# Patient Record
Sex: FEMALE
[De-identification: names, ages, dates, MRNs, and addresses within clinical notes are randomized per-mention and may not be internally consistent; named-entity substitution may affect disease eponyms.]

---

## 2017-07-24 PROBLEM — Z00.00 ENCOUNTER FOR PREVENTIVE HEALTH EXAMINATION: Status: ACTIVE | Noted: 2017-07-24

## 2017-07-25 ENCOUNTER — APPOINTMENT (OUTPATIENT)
Dept: OPHTHALMOLOGY | Facility: CLINIC | Age: 67
End: 2017-07-25

## 2023-07-31 ENCOUNTER — APPOINTMENT (OUTPATIENT)
Dept: PODIATRY | Facility: CLINIC | Age: 73
End: 2023-07-31
Payer: MEDICARE

## 2023-07-31 DIAGNOSIS — M76.72 PERONEAL TENDINITIS, LEFT LEG: ICD-10-CM

## 2023-07-31 DIAGNOSIS — M21.619 BUNION OF UNSPECIFIED FOOT: ICD-10-CM

## 2023-07-31 DIAGNOSIS — M21.40 FLAT FOOT [PES PLANUS] (ACQUIRED), UNSPECIFIED FOOT: ICD-10-CM

## 2023-07-31 DIAGNOSIS — Z78.9 OTHER SPECIFIED HEALTH STATUS: ICD-10-CM

## 2023-07-31 DIAGNOSIS — S93.402A SPRAIN OF UNSPECIFIED LIGAMENT OF LEFT ANKLE, INITIAL ENCOUNTER: ICD-10-CM

## 2023-07-31 DIAGNOSIS — Z86.39 PERSONAL HISTORY OF OTHER ENDOCRINE, NUTRITIONAL AND METABOLIC DISEASE: ICD-10-CM

## 2023-07-31 PROCEDURE — 73600 X-RAY EXAM OF ANKLE: CPT | Mod: LT

## 2023-07-31 PROCEDURE — 99213 OFFICE O/P EST LOW 20 MIN: CPT | Mod: 25

## 2023-07-31 PROCEDURE — 73630 X-RAY EXAM OF FOOT: CPT | Mod: LT

## 2023-08-02 PROBLEM — Z86.39 HISTORY OF HYPERCHOLESTEROLEMIA: Status: RESOLVED | Noted: 2023-07-31 | Resolved: 2023-08-02

## 2023-08-07 PROBLEM — S93.402A SPRAIN OF ANKLE, LEFT: Status: ACTIVE | Noted: 2023-08-02

## 2023-08-07 PROBLEM — M76.72 PERONEAL TENDINITIS OF LEFT LOWER EXTREMITY: Status: ACTIVE | Noted: 2023-08-02

## 2023-08-21 NOTE — ASSESSMENT
[FreeTextEntry1] : Impression: Peroneal tendonitis. Pes planus bilaterally. Ankle sprain left.  Treatment: Discussed findings and condition with the patient along with the patient's daughter who also translated for her mother. Patient is to avoid elliptical at this time at the gym. She is to walk and do non-aerobic activity. An ace wrap was applied and she was instructed on use to apply in the morning and remove at night. She is to ice the area. She is to continue with Meloxicam with Celebrex, one or the other. She is not to take more than prescribed and only one medication per day. She was recommended OTC Powerstep insoles for her sneakers and for her shoes. She is to avoid walking barefoot. She is to avoid flats and recommend a slight wedge and more of a rigid sole shoe. The patient is to return in approximately 2 to 3 weeks if pain continues. Any increase in pain, swelling or redness the patient is to return sooner and contact the office.

## 2023-08-21 NOTE — HISTORY OF PRESENT ILLNESS
[Sneakers] : tereza [FreeTextEntry1] : Patient presents today with her daughter. She is a [place]-speaking female. Daughter is available for translation and was available for the entire exam. The patient complains of pain on the bottom of the ball of her left foot and new onset of pain on the lateral aspect of her left foot and ankle. She denies any history or trauma. She states the issue on the lateral aspect of the foot and ankle has been present for approximately one month. She denies any history of trauma or twisting of the foot but she states that she started going to the gym approximately 1 month ago. She denies taking any medications for the pain. She is on Celebrex and Meloxicam as needed for multiple joint discomfort and arthritis. She denies any other pedal complaints at this time. She presents wearing a slip-on type sneaker. She denies any pain with first steps in the morning and it becomes painful with ambulation and throughout the day. She denies any redness or warmth to the joint. She does state it begins to swell later on in the day with increase in activity.

## 2023-08-21 NOTE — PHYSICAL EXAM
[2+] : left foot dorsalis pedis 2+ [FreeTextEntry3] : Temperature gradient within normal limits. CFT: 3 seconds x10. Negative hair growth.  [de-identified] : She has a forefoot varus fully compensated with pain on palpation of the plantar submet. 2 and 3 of the left foot. 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 The patient has pain on palpation of the base of the 5th metatarsal tuberosity at the insertion of the peroneal tendon. There is negative pain on palpation proximally to the peroneal tendon. Negative pain on palpation of the cuboid. There is increased mobility of the lateral column. Negative pain plantarly along the peroneal longus or plantar cuboid area. Negative pain on palpation of the plantar fascia band. Ankle joint and subtalar joint ROM was within normal limits. There is some tenderness along the collateral ligaments of the left ankle.   [FreeTextEntry1] : Epicritic sensation and light touch are intact bilateral.

## 2023-08-21 NOTE — PROCEDURE
[FreeTextEntry1] : X-rays were taken of the ankle and foot to rule out avulsion fracture and stress fracture of the left foot. Findings weight-bearing, DP, medial oblique of the left ankle and DP, medial oblique and lateral of the left foot were obtained. There is negative fracture, dislocation, subluxation or signs of foreign body. Pes planus is noted.

## 2023-10-03 ENCOUNTER — APPOINTMENT (OUTPATIENT)
Dept: PODIATRY | Facility: CLINIC | Age: 73
End: 2023-10-03

## 2023-10-04 ENCOUNTER — APPOINTMENT (OUTPATIENT)
Dept: PODIATRY | Facility: CLINIC | Age: 73
End: 2023-10-04
Payer: MEDICARE

## 2023-10-04 DIAGNOSIS — M77.42 METATARSALGIA, RIGHT FOOT: ICD-10-CM

## 2023-10-04 DIAGNOSIS — M72.2 PLANTAR FASCIAL FIBROMATOSIS: ICD-10-CM

## 2023-10-04 DIAGNOSIS — M77.41 METATARSALGIA, RIGHT FOOT: ICD-10-CM

## 2023-10-04 PROCEDURE — 99213 OFFICE O/P EST LOW 20 MIN: CPT | Mod: 25

## 2023-10-04 PROCEDURE — 20550 NJX 1 TENDON SHEATH/LIGAMENT: CPT | Mod: RT

## 2023-10-04 PROCEDURE — 73630 X-RAY EXAM OF FOOT: CPT | Mod: RT

## 2023-11-08 ENCOUNTER — APPOINTMENT (OUTPATIENT)
Dept: PODIATRY | Facility: CLINIC | Age: 73
End: 2023-11-08
Payer: MEDICARE

## 2023-11-08 PROCEDURE — 99213 OFFICE O/P EST LOW 20 MIN: CPT

## 2023-11-08 RX ORDER — MELOXICAM 15 MG/1
15 TABLET ORAL DAILY
Qty: 14 | Refills: 1 | Status: ACTIVE | COMMUNITY
Start: 2023-11-08 | End: 1900-01-01

## 2023-11-22 ENCOUNTER — APPOINTMENT (OUTPATIENT)
Dept: PODIATRY | Facility: CLINIC | Age: 73
End: 2023-11-22

## 2023-11-22 DIAGNOSIS — M76.821 POSTERIOR TIBIAL TENDINITIS, RIGHT LEG: ICD-10-CM

## 2023-12-06 ENCOUNTER — APPOINTMENT (OUTPATIENT)
Dept: PODIATRY | Facility: CLINIC | Age: 73
End: 2023-12-06
Payer: MEDICARE

## 2023-12-06 DIAGNOSIS — R22.40 LOCALIZED SWELLING, MASS AND LUMP, UNSPECIFIED LOWER LIMB: ICD-10-CM

## 2023-12-06 PROCEDURE — 99213 OFFICE O/P EST LOW 20 MIN: CPT

## 2024-04-29 ENCOUNTER — APPOINTMENT (OUTPATIENT)
Dept: PODIATRY | Facility: CLINIC | Age: 74
End: 2024-04-29

## 2025-06-25 ENCOUNTER — APPOINTMENT (OUTPATIENT)
Dept: PODIATRY | Facility: CLINIC | Age: 75
End: 2025-06-25